# Patient Record
Sex: FEMALE | Race: BLACK OR AFRICAN AMERICAN | NOT HISPANIC OR LATINO
[De-identification: names, ages, dates, MRNs, and addresses within clinical notes are randomized per-mention and may not be internally consistent; named-entity substitution may affect disease eponyms.]

---

## 2022-06-13 ENCOUNTER — APPOINTMENT (OUTPATIENT)
Dept: RHEUMATOLOGY | Facility: CLINIC | Age: 27
End: 2022-06-13

## 2022-06-13 VITALS
SYSTOLIC BLOOD PRESSURE: 119 MMHG | BODY MASS INDEX: 25.05 KG/M2 | RESPIRATION RATE: 16 BRPM | HEART RATE: 93 BPM | WEIGHT: 150.38 LBS | TEMPERATURE: 98 F | HEIGHT: 65 IN | DIASTOLIC BLOOD PRESSURE: 81 MMHG | OXYGEN SATURATION: 100 %

## 2022-06-13 VITALS
RESPIRATION RATE: 16 BRPM | HEIGHT: 65 IN | BODY MASS INDEX: 20.41 KG/M2 | WEIGHT: 122.5 LBS | OXYGEN SATURATION: 100 % | TEMPERATURE: 98 F | DIASTOLIC BLOOD PRESSURE: 67 MMHG | SYSTOLIC BLOOD PRESSURE: 112 MMHG | HEART RATE: 67 BPM

## 2022-06-13 LAB
ALBUMIN SERPL ELPH-MCNC: 5.3 G/DL
ALP BLD-CCNC: 61 U/L
ALT SERPL-CCNC: 12 U/L
ANION GAP SERPL CALC-SCNC: 12 MMOL/L
APPEARANCE: CLEAR
AST SERPL-CCNC: 18 U/L
BACTERIA: NEGATIVE
BASOPHILS # BLD AUTO: 0.04 K/UL
BASOPHILS NFR BLD AUTO: 0.9 %
BILIRUB SERPL-MCNC: 1 MG/DL
BILIRUBIN URINE: NEGATIVE
BLOOD URINE: NEGATIVE
BUN SERPL-MCNC: 8 MG/DL
CALCIUM SERPL-MCNC: 10 MG/DL
CHLORIDE SERPL-SCNC: 101 MMOL/L
CO2 SERPL-SCNC: 27 MMOL/L
COLOR: YELLOW
CREAT SERPL-MCNC: 0.76 MG/DL
CREAT SPEC-SCNC: 315 MG/DL
CREAT/PROT UR: 0.1 RATIO
CRP SERPL-MCNC: <3 MG/L
EGFR: 109 ML/MIN/1.73M2
EOSINOPHIL # BLD AUTO: 0.06 K/UL
EOSINOPHIL NFR BLD AUTO: 1.3 %
ERYTHROCYTE [SEDIMENTATION RATE] IN BLOOD BY WESTERGREN METHOD: 14 MM/HR
GLUCOSE QUALITATIVE U: NEGATIVE
GLUCOSE SERPL-MCNC: 68 MG/DL
HCT VFR BLD CALC: 38.3 %
HGB BLD-MCNC: 12.8 G/DL
HYALINE CASTS: 6 /LPF
IMM GRANULOCYTES NFR BLD AUTO: 0.2 %
KETONES URINE: NEGATIVE
LEUKOCYTE ESTERASE URINE: NEGATIVE
LYMPHOCYTES # BLD AUTO: 1.99 K/UL
LYMPHOCYTES NFR BLD AUTO: 43.5 %
MAN DIFF?: NORMAL
MCHC RBC-ENTMCNC: 30.2 PG
MCHC RBC-ENTMCNC: 33.4 GM/DL
MCV RBC AUTO: 90.3 FL
MICROSCOPIC-UA: NORMAL
MONOCYTES # BLD AUTO: 0.47 K/UL
MONOCYTES NFR BLD AUTO: 10.3 %
NEUTROPHILS # BLD AUTO: 2 K/UL
NEUTROPHILS NFR BLD AUTO: 43.8 %
NITRITE URINE: NEGATIVE
PH URINE: 6.5
PLATELET # BLD AUTO: 280 K/UL
POTASSIUM SERPL-SCNC: 4.6 MMOL/L
PROT SERPL-MCNC: 8 G/DL
PROT UR-MCNC: 15 MG/DL
PROTEIN URINE: NORMAL
RBC # BLD: 4.24 M/UL
RBC # FLD: 13.8 %
RED BLOOD CELLS URINE: 4 /HPF
SODIUM SERPL-SCNC: 141 MMOL/L
SPECIFIC GRAVITY URINE: 1.03
SQUAMOUS EPITHELIAL CELLS: 2 /HPF
TSH SERPL-ACNC: 3.29 UIU/ML
UROBILINOGEN URINE: NORMAL
WBC # FLD AUTO: 4.57 K/UL
WHITE BLOOD CELLS URINE: 1 /HPF

## 2022-06-13 NOTE — HISTORY OF PRESENT ILLNESS
[FreeTextEntry1] : First visit to me. The patient is a 27 yo AA female referred for evaluation of autoimmune disease. History is obtained from the patient; no records are available for review except for a note and lab results from Rheumatology in 2016. \par Current Hx: The patient gives an extensive history of the following complaints and diagnoses over the past 7.5 years:\par •	Episodic back pain and lower body paralysis: these episodes started in 2015 (age 19) and occur randomly. She says that she has been taken to the ER but each time sent home with muscle relaxants that do not work. Also has chronic numbness/tingling of the hands and feet. Seen by a neurologist in 2015 who did MRI’s of the cervical and lumbar spine:\par o	MRI LS spine 5/5/15: facet joint hypertrophy throughout the LS, mild disc bulges at L4-5 and to a lesser degree at L5-S1 that are unchanged compared to prior study 8/1/14.\par o	MRI C-spine 5/5/15: no disc herniation, no canal stenosis, no change in narrowing of the left-sided foraminal narrowing at the C3-4 level compared to MRI 8/1/14.\josefa Continues to have these episodes but thinks they improved when she stopped eating meat. She has seen multiple specialists over the years and been told “pinched nerves”, muscle pain.\par •	Told of CTS in 2016\par •	Episodes of “inflamed, painful” eyes: seen by Ophthalmology and told of a hole in the retina and mild increased ocular pressure\par •	GI: Chronic constipation and nausea with fluctuating weight. Her normal weight is 125-130, it ranges 120-135? Describes episodic difficulty swallowing.\par •	Scalp rash: describes a scaly rash on the scalp that bleeds and scabs for many years that was untreated. Seen by Dermatology in the last several months and told she had “psoriasis of the scalp”. Treated with topical ketoconazole and steroid with good results. Says she has similar scaly rashes on her ears and face that have resolved with the topical treatment.\par •	Nasal sores: c/o sores in the nares since 8/21 that get very inflamed and hurt- these appear to be located along the inside part of the outer nares. No response in 2021 to terbinafine x 30 days.\par •	Chronic Fatigue: describes debilitating fatigue where she cannot function at all and episodes of complete loss of facial muscle tone. Also c/o severe brain fog, memory loss and dizziness.\par •	Chronic pain in the knees, shoulders, hips and wrists, no swelling or warmth.\par •	Fevers: patient says she has fevers but says that T 98 or 99 is a fever for her since her baseline Temp is 96.\par •	Evaluated 2016 by Dr. Sarkar for LBP/autoimmune disease: serology all negative (ALIA, CCP, RF), normal inflammatory markers. Assessment was that the pain was out of proportion to the MRI findings and was c/w myofascial pain. Given Flexeril.\par •	Depression/anxiety: diagnosed 4/15/22: says that she had not been referred to psychiatry before then\par •	Ankylosing Spondylitis: currently seeing a Rheumatologist, Dr. Arcadio Carlisle, who diagnosed her with ankylosing spondylitis based on another MRI that reportedly showed SI joint erosions. Started secukinumab 5/22.\par  \par Medications: meloxicam 15 mg qd prn, secukinumab 300 mg/4 weeks (5/22), Klonazepam 0.5 mg qd, fluoxetine 10 mg qd, fluticasone nasal spray, ketoconazole shampoo, clobetasol solution for scalp, face creams: ketoconazole, triamcinolone 0.1%, CBD medical marijuana\par \par Allergies: she is not sure; had hives after taking some kind of dose-bhavani for bronchitis\par \par Family Hx: 1 sibling with scoliosis and 1 with asthma, otherwise no known autoimmune disease\par \par Social: no tobacco, ETOH or drug use, not working, completed an Associates Degree \par \par OB/GYN: P2, G0, 2 terminations. Has a regular menses but heavy and has been told she has ovarian cysts\par \par No hospitalizations or surgeries\par \par Physical Exam: well developed, well nourished, NAD\par Skin: no rash\par HEENT: no oral/nasal lesions, no alopecia\par No lymphadenopathy\par Lungs: clear BL\par Heart: S1S2, reg rate, no murmers\par Abd: flat, soft, non-tender\par Ext: FROM all joints, + tenderness BL shoulders, elbows, wrists, right MCP 2, 3, 5, PIP 2, 5, left MCP 2, 3, PIP 3, BL knees, no joint swelling, no edema\par Back: diffuse trapezius muscle tenderness, spinal and paraspinal tenderness throughout, including the SI joint area, Murtaza sign negative\par Neuro: no gross abnormalities\par

## 2022-06-13 NOTE — ASSESSMENT
[FreeTextEntry1] : 27 yo woman with extensive list of symptoms for the past 7.5 years (arthralgias, myalgias, chronic fatigue, chronic LBP with episodic associated paralysis, scaly rashes on the scalp and face, nasal sores, chronic constipation and nausea) and has recently been diagnosed with Ankylosing Spondylitis, depression/anxiety and “psoriasis of the scalp”. Of note, the back pain accompanied by paralysis is difficult to explain, there is no residual damage after 7.5 years and the MRI’s done in 2014 and 2015 did not show any significant lesions. The scalp lesions sound like seborrheic dermatitis and have resolved with topical treatment. Had lengthy discussion with the patient. I will need to review records from her MDs in order to generate an opinion. Her symptoms, the lack of residual damage or progression and the normal physical exam do not suggest an autoimmune disease at this time. Plan:\par •	Check labs and serologies today\par •	Patient to send me copies of all her tests; a few things were sent today from the rheumatologist but these consisted only of a negative quantiferon test, negative Hep B, C, Hgb A1C 5.4, normal chemistries (alb 4.4), normal CBC\par •	f/u in 4-6 weeks once I have been able to review the lab tests and prior studies\par

## 2022-06-14 LAB
C3 SERPL-MCNC: 125 MG/DL
C4 SERPL-MCNC: 26 MG/DL
CARDIOLIPIN AB SER IA-ACNC: NEGATIVE
CONFIRM: 28.1 SEC
DRVVT IMM 1:2 NP PPP: NORMAL
DRVVT SCREEN TO CONFIRM RATIO: 0.95 RATIO
DSDNA AB SER-ACNC: 15 IU/ML
ENA RNP AB SER IA-ACNC: 0.2 AL
ENA SM AB SER IA-ACNC: <0.2 AL
ENA SS-A AB SER IA-ACNC: <0.2 AL
ENA SS-B AB SER IA-ACNC: <0.2 AL
SCREEN DRVVT: 31.5 SEC

## 2022-06-15 LAB
ANA SER IF-ACNC: NEGATIVE
GLIADIN IGA SER QL: <5 UNITS
GLIADIN IGG SER QL: <5 UNITS
GLIADIN PEPTIDE IGA SER-ACNC: NEGATIVE
GLIADIN PEPTIDE IGG SER-ACNC: NEGATIVE
TTG IGA SER IA-ACNC: <1.2 U/ML
TTG IGA SER-ACNC: NEGATIVE
TTG IGG SER IA-ACNC: <1.2 U/ML
TTG IGG SER IA-ACNC: NEGATIVE

## 2022-06-16 LAB
CCP AB SER IA-ACNC: <8 UNITS
RF+CCP IGG SER-IMP: NEGATIVE

## 2022-07-12 ENCOUNTER — TRANSCRIPTION ENCOUNTER (OUTPATIENT)
Age: 27
End: 2022-07-12

## 2022-07-13 ENCOUNTER — APPOINTMENT (OUTPATIENT)
Dept: RHEUMATOLOGY | Facility: CLINIC | Age: 27
End: 2022-07-13

## 2022-07-13 VITALS
BODY MASS INDEX: 20.63 KG/M2 | HEART RATE: 60 BPM | RESPIRATION RATE: 16 BRPM | DIASTOLIC BLOOD PRESSURE: 66 MMHG | SYSTOLIC BLOOD PRESSURE: 104 MMHG | TEMPERATURE: 97.7 F | WEIGHT: 124 LBS | OXYGEN SATURATION: 100 %

## 2022-10-20 NOTE — HISTORY OF PRESENT ILLNESS
[FreeTextEntry1] : Follow-up visit. The patient is a 25 yo AA female referred for evaluation of autoimmune disease. History is obtained from the patient; no records are available for review except for a note and lab results from Rheumatology in 2016. She has a history of the following complaints since approximately 2014: \par \par •	Episodic back pain and lower body paralysis: these episodes started in 2015 (age 19) and occur randomly. She says that she has been taken to the ER but each time sent home with muscle relaxants that do not work. Also has chronic numbness/tingling of the hands and feet. Seen by a neurologist in 2015 who did MRI’s of the cervical and lumbar spine:\par o	MRI LS spine 5/5/15: facet joint hypertrophy throughout the LS, mild disc bulges at L4-5 and to a lesser degree at L5-S1 that are unchanged compared to prior study 8/1/14.\par o	MRI C-spine 5/5/15: no disc herniation, no canal stenosis, no change in narrowing of the left-sided foraminal narrowing at the C3-4 level compared to MRI 8/1/14.\josefa Continues to have these episodes but thinks they improved when she stopped eating meat. She has seen multiple specialists over the years and been told “pinched nerves”, muscle pain.\par •	Told of CTS in 2016\par •	Episodes of “inflamed, painful” eyes: seen by Ophthalmology and told of a hole in the retina and mild increased ocular pressure\par •	GI: Chronic constipation and nausea with fluctuating weight. Her normal weight is 125-130, it ranges 120-135? Describes episodic difficulty swallowing.\par •	Scalp rash: describes a scaly rash on the scalp that bleeds and scabs for many years that was untreated. Seen by Dermatology in the last several months and told she had “psoriasis of the scalp”. Treated with topical ketoconazole and steroid with good results. Says she has similar scaly rashes on her ears and face that have resolved with the topical treatment.\par •	Nasal sores: c/o sores in the nares since 8/21 that get very inflamed and hurt- these appear to be located along the inside part of the outer nares. No response in 2021 to terbinafine x 30 days.\par •	Chronic Fatigue: describes debilitating fatigue where she cannot function at all and episodes of complete loss of facial muscle tone. Also c/o severe brain fog, memory loss and dizziness.\par •	Chronic pain in the knees, shoulders, hips and wrists, no swelling or warmth.\par •	Fevers: patient says she has fevers but says that T 98 or 99 is a fever for her since her baseline Temp is 96.\par •	Evaluated 2016 by Dr. Sarkar for LBP/autoimmune disease: serology all negative (ALIA, CCP, RF), normal inflammatory markers. Assessment was that the pain was out of proportion to the MRI findings and was c/w myofascial pain. Given Flexeril.\par •	Depression/anxiety: diagnosed 4/15/22: says that she had not been referred to psychiatry before then\par •	Ankylosing Spondylitis: currently seeing a Rheumatologist, Dr. Arcadio Carlisle, who diagnosed her with ankylosing spondylitis based on another MRI that reportedly showed SI joint erosions. Started secukinumab 5/22.\par \par \par Current Hx: She is here for a follow-up visit. All complaints have not changed. She has continued the secukinumab injections but has not noted any improvement. \par  \par Medications: meloxicam 15 mg qd prn, secukinumab 300 mg/4 weeks (5/22), Klonazepam 0.5 mg qd, fluoxetine 10 mg qd, fluticasone nasal spray, ketoconazole shampoo, clobetasol solution for scalp, face creams: ketoconazole, triamcinolone 0.1%, CBD medical marijuana\par \par Allergies: she is not sure; had hives after taking some kind of dose-bhavani for bronchitis\par \par Family Hx: 1 sibling with scoliosis and 1 with asthma, otherwise no known autoimmune disease\par \par Social: no tobacco, ETOH or drug use, not working, completed an Associates Degree \par \par OB/GYN: P2, G0, 2 terminations. Has a regular menses but heavy and has been told she has ovarian cysts\par \par No hospitalizations or surgeries\par \par Physical Exam: well developed, well nourished, NAD\par no exam done today as she was here for lab results and discussion\par

## 2022-10-20 NOTE — ASSESSMENT
[FreeTextEntry1] : 25 yo woman with extensive list of symptoms since 2014 (arthralgias, myalgias, chronic fatigue, chronic LBP with episodic associated paralysis, scaly rashes on the scalp and face, nasal sores, chronic constipation and nausea) and has recently been diagnosed with Ankylosing Spondylitis, depression/anxiety and “psoriasis of the scalp”. Of note, the back pain accompanied by paralysis is difficult to explain, there is no residual damage after 7.5 years and the MRI’s done in 2014 and 2015 did not show any significant lesions. The scalp lesions sound like seborrheic dermatitis and have resolved with topical treatment. Labs from the last visit are all normal and all serologies, including ALIA, Sm, RNP, Ro, La, CCP, LAC, ACL, complement are all negative. Normal CBC, UA and chemistries. She was going to send me additional medical records (scans in particular) but has not done so. At this time I am unable to make a diagnosis of any autoimmune condition. Had lengthy discussion about this with the patient and suggested that perhaps some of her symptoms may be related to fibromyalgia and/ or depression/anxiety. Plan:\par - release for medical records obtained and will try to get records\par - will call the patient after this \par \par \par Addendum: review of prior imaging studies\par MRI 3/22: chronic BL sacroiliitis with subchondral sclerosis and a few scattered erosions, R>L, no marrow edema\par Xray L-S spine 2/22: mild L5-S1 disc space narrowing\par MRI brain 10/21 (compared to 8/14): normal MRI brain and pituitary without contrast\par abd US 1/4/21: single intrauterine fetus, no ovarian masses or cysts\par MRI c-spine C-: cervical disc herniations: C4-5 impressing on thecal sac with mild central stenosis, C5-C6 central herniation impressing on thecal sac and impinging on the ventral cord surface with mild/mod central stenosis, C6-C7 right paracentral disc herniation impressing on the ventral thecal sac without significant stenosis\par MRI pelvis C-, 2/20: possible mild chronic sacroiliitis, R>L mild gluteus minimus insertional tendinosis/peritendonitis, left ovarian cyst\par MRI LS C-, 8/21: disc bulge at L4-5 annd L5-S1 with no significant central or foraminal stenosis at any lumbar level\par MRI left hip C-, 2/20: mild gluteus minimus tendinosis without tears, small nondisplaced tear involving the anterolateral aspect of the tight acetabular labrum\par MRI LS spine C-, 8/14: annular bulges L4-5, L5-S1 no central canal stenosis or neural foraminal narrowing\par MRI brain c-, 8/14: normal\par MRI C spine C-, 8/14: moderate bony narrowing of the neuroforamen for left C4 nerve root\par MRI C-spine C-, 5/15: no disc herniation or canal stenosis, no change in degree of slight foraminal narrowing at the C3-4 level compared to MRI 8/14\par MRI LS C-, 5/15: facet joint hypertrophy throughout lumbar spine, mild disc bulges at L4-5 and less at L5-S1, unchanged from 8/14, no significant canal or foraminal stenosis\par

## 2024-11-04 ENCOUNTER — EMERGENCY (EMERGENCY)
Facility: HOSPITAL | Age: 29
LOS: 0 days | Discharge: HOME | End: 2024-11-04
Attending: EMERGENCY MEDICINE
Payer: MEDICAID

## 2024-11-04 VITALS
RESPIRATION RATE: 18 BRPM | SYSTOLIC BLOOD PRESSURE: 113 MMHG | TEMPERATURE: 99 F | HEART RATE: 78 BPM | OXYGEN SATURATION: 99 % | DIASTOLIC BLOOD PRESSURE: 78 MMHG

## 2024-11-04 VITALS
WEIGHT: 128.09 LBS | DIASTOLIC BLOOD PRESSURE: 68 MMHG | RESPIRATION RATE: 18 BRPM | OXYGEN SATURATION: 99 % | SYSTOLIC BLOOD PRESSURE: 113 MMHG | HEIGHT: 66 IN | HEART RATE: 70 BPM | TEMPERATURE: 98 F

## 2024-11-04 DIAGNOSIS — R05.9 COUGH, UNSPECIFIED: ICD-10-CM

## 2024-11-04 DIAGNOSIS — R06.00 DYSPNEA, UNSPECIFIED: ICD-10-CM

## 2024-11-04 DIAGNOSIS — M45.9 ANKYLOSING SPONDYLITIS OF UNSPECIFIED SITES IN SPINE: ICD-10-CM

## 2024-11-04 DIAGNOSIS — R07.89 OTHER CHEST PAIN: ICD-10-CM

## 2024-11-04 DIAGNOSIS — Z88.8 ALLERGY STATUS TO OTHER DRUGS, MEDICAMENTS AND BIOLOGICAL SUBSTANCES: ICD-10-CM

## 2024-11-04 LAB
ALBUMIN SERPL ELPH-MCNC: 4 G/DL — SIGNIFICANT CHANGE UP (ref 3.3–5)
ALP SERPL-CCNC: 57 U/L — SIGNIFICANT CHANGE UP (ref 40–120)
ALT FLD-CCNC: 21 U/L — SIGNIFICANT CHANGE UP (ref 12–78)
ANION GAP SERPL CALC-SCNC: 8 MMOL/L — SIGNIFICANT CHANGE UP (ref 5–17)
APTT BLD: 36.6 SEC — HIGH (ref 24.5–35.6)
AST SERPL-CCNC: 36 U/L — SIGNIFICANT CHANGE UP (ref 15–37)
BASOPHILS # BLD AUTO: 0.03 K/UL — SIGNIFICANT CHANGE UP (ref 0–0.2)
BASOPHILS NFR BLD AUTO: 0.4 % — SIGNIFICANT CHANGE UP (ref 0–2)
BILIRUB SERPL-MCNC: 0.5 MG/DL — SIGNIFICANT CHANGE UP (ref 0.2–1.2)
BUN SERPL-MCNC: 12 MG/DL — SIGNIFICANT CHANGE UP (ref 7–23)
CALCIUM SERPL-MCNC: 9 MG/DL — SIGNIFICANT CHANGE UP (ref 8.5–10.1)
CHLORIDE SERPL-SCNC: 105 MMOL/L — SIGNIFICANT CHANGE UP (ref 96–108)
CO2 SERPL-SCNC: 24 MMOL/L — SIGNIFICANT CHANGE UP (ref 22–31)
CREAT SERPL-MCNC: 0.74 MG/DL — SIGNIFICANT CHANGE UP (ref 0.5–1.3)
EGFR: 111 ML/MIN/1.73M2 — SIGNIFICANT CHANGE UP
EOSINOPHIL # BLD AUTO: 0.06 K/UL — SIGNIFICANT CHANGE UP (ref 0–0.5)
EOSINOPHIL NFR BLD AUTO: 0.8 % — SIGNIFICANT CHANGE UP (ref 0–6)
GLUCOSE SERPL-MCNC: 95 MG/DL — SIGNIFICANT CHANGE UP (ref 70–99)
HCG SERPL-ACNC: <1 MIU/ML — SIGNIFICANT CHANGE UP
HCT VFR BLD CALC: 39.1 % — SIGNIFICANT CHANGE UP (ref 34.5–45)
HGB BLD-MCNC: 12.7 G/DL — SIGNIFICANT CHANGE UP (ref 11.5–15.5)
IMM GRANULOCYTES NFR BLD AUTO: 0.3 % — SIGNIFICANT CHANGE UP (ref 0–0.9)
INR BLD: 1.04 RATIO — SIGNIFICANT CHANGE UP (ref 0.85–1.16)
LIDOCAIN IGE QN: 55 U/L — SIGNIFICANT CHANGE UP (ref 13–75)
LYMPHOCYTES # BLD AUTO: 2.76 K/UL — SIGNIFICANT CHANGE UP (ref 1–3.3)
LYMPHOCYTES # BLD AUTO: 34.9 % — SIGNIFICANT CHANGE UP (ref 13–44)
MAGNESIUM SERPL-MCNC: 2 MG/DL — SIGNIFICANT CHANGE UP (ref 1.6–2.6)
MCHC RBC-ENTMCNC: 28.8 PG — SIGNIFICANT CHANGE UP (ref 27–34)
MCHC RBC-ENTMCNC: 32.5 G/DL — SIGNIFICANT CHANGE UP (ref 32–36)
MCV RBC AUTO: 88.7 FL — SIGNIFICANT CHANGE UP (ref 80–100)
MONOCYTES # BLD AUTO: 0.71 K/UL — SIGNIFICANT CHANGE UP (ref 0–0.9)
MONOCYTES NFR BLD AUTO: 9 % — SIGNIFICANT CHANGE UP (ref 2–14)
NEUTROPHILS # BLD AUTO: 4.33 K/UL — SIGNIFICANT CHANGE UP (ref 1.8–7.4)
NEUTROPHILS NFR BLD AUTO: 54.6 % — SIGNIFICANT CHANGE UP (ref 43–77)
NRBC # BLD: 0 /100 WBCS — SIGNIFICANT CHANGE UP (ref 0–0)
NT-PROBNP SERPL-SCNC: 13 PG/ML — SIGNIFICANT CHANGE UP (ref 0–125)
PLATELET # BLD AUTO: 287 K/UL — SIGNIFICANT CHANGE UP (ref 150–400)
POTASSIUM SERPL-MCNC: 4.9 MMOL/L — SIGNIFICANT CHANGE UP (ref 3.5–5.3)
POTASSIUM SERPL-SCNC: 4.9 MMOL/L — SIGNIFICANT CHANGE UP (ref 3.5–5.3)
PROT SERPL-MCNC: 8.4 GM/DL — HIGH (ref 6–8.3)
PROTHROM AB SERPL-ACNC: 11.8 SEC — SIGNIFICANT CHANGE UP (ref 9.9–13.4)
RBC # BLD: 4.41 M/UL — SIGNIFICANT CHANGE UP (ref 3.8–5.2)
RBC # FLD: 14.1 % — SIGNIFICANT CHANGE UP (ref 10.3–14.5)
SODIUM SERPL-SCNC: 137 MMOL/L — SIGNIFICANT CHANGE UP (ref 135–145)
TROPONIN I, HIGH SENSITIVITY RESULT: 10.8 NG/L — SIGNIFICANT CHANGE UP
WBC # BLD: 7.91 K/UL — SIGNIFICANT CHANGE UP (ref 3.8–10.5)
WBC # FLD AUTO: 7.91 K/UL — SIGNIFICANT CHANGE UP (ref 3.8–10.5)

## 2024-11-04 PROCEDURE — 99285 EMERGENCY DEPT VISIT HI MDM: CPT

## 2024-11-04 PROCEDURE — 93010 ELECTROCARDIOGRAM REPORT: CPT

## 2024-11-04 PROCEDURE — 71046 X-RAY EXAM CHEST 2 VIEWS: CPT | Mod: 26

## 2024-11-04 RX ORDER — ACETAMINOPHEN 500 MG
1000 TABLET ORAL ONCE
Refills: 0 | Status: COMPLETED | OUTPATIENT
Start: 2024-11-04 | End: 2024-11-04

## 2024-11-04 RX ORDER — KETOROLAC TROMETHAMINE 30 MG/ML
30 INJECTION INTRAMUSCULAR; INTRAVENOUS ONCE
Refills: 0 | Status: DISCONTINUED | OUTPATIENT
Start: 2024-11-04 | End: 2024-11-04

## 2024-11-04 RX ORDER — NAPROXEN 250 MG/1
1 TABLET ORAL
Qty: 42 | Refills: 0
Start: 2024-11-04 | End: 2024-11-24

## 2024-11-04 RX ADMIN — Medication 400 MILLIGRAM(S): at 19:36

## 2024-11-04 RX ADMIN — KETOROLAC TROMETHAMINE 30 MILLIGRAM(S): 30 INJECTION INTRAMUSCULAR; INTRAVENOUS at 21:53

## 2024-11-04 NOTE — ED PROVIDER NOTE - NS ED ROS FT
CONSTITUTIONAL: No fever, no chills, no fatigue  EYES: No visual changes  ENT: No ear pain, no sore throat  CARDIOVASCULAR: no palpitations  GI: No abdominal pain, no nausea, no vomiting, no constipation, no diarrhea  GENITOURINARY: No dysuria, no frequency, no hematuria  MUSKULOSKELETAL:  no joint pain, no myalgias  SKIN: No rash  NEURO: No headache    ALL OTHER SYSTEMS NEGATIVE.

## 2024-11-04 NOTE — ED PROVIDER NOTE - PHYSICAL EXAMINATION
GEN: Awake, alert, interactive, NAD.  HEAD AND NECK: NC/AT. Airway patent. Neck supple.   EYES:  Clear b/l. EOMI. PERRL.   ENT: Moist mucus membranes.   CARDIAC: Regular rate, regular rhythm. No evident pedal edema.    RESP/CHEST: Normal respiratory effort with no use of accessory muscles or retractions. Clear throughout on auscultation.  ABD: soft, non-distended, non-tender. No rebound, no guarding.   BACK: No midline spinal TTP. No CVAT.   EXTREMITIES: Moving all extremities with no apparent deformities. (+) equal pulses in upper and lower extremities.   SKIN: Warm, dry, intact normal color. No rash.   NEURO: AOx3, CN II-XII grossly intact, no focal deficits.   PSYCH: Appropriate mood and affect.

## 2024-11-04 NOTE — ED PROVIDER NOTE - PATIENT PORTAL LINK FT
You can access the FollowMyHealth Patient Portal offered by [facility] by registering at the following website: [email or web address]. By joining [employer]’s FollowMyHealth portal, you will also be able to view your health information using other applications (apps) compatible with our system.

## 2024-11-04 NOTE — ED PROVIDER NOTE - OBJECTIVE STATEMENT
28 y/o female with ankylosing spondylitis," fluid in the lungs" here with left sided chest pain x 1 week. Pt states pain radiated to her back yesterday. Pt states pain is worse with breathing and movement. Mild dyspnea. Denies fever, chills. Pt states she have mild cough with white phlegm and blood streaks. Denies recent travel, sick contact, nausea, vomiting, abdominal pain. Denies smoking, drinking, drugs. Pt denies heavy lifting.

## 2024-11-04 NOTE — ED PROVIDER NOTE - CLINICAL SUMMARY MEDICAL DECISION MAKING FREE TEXT BOX
28 y/o female ankylosing spondylitis here with left chest pain x 1 week radiating to the back , worse with deep breathing. Vs stable.   Ddx include but not limited to msk pain, pe, acs, pna.   Will obtain cardiac labs, ekg, cxr and pain meds and reassess.

## 2024-11-04 NOTE — ED PROVIDER NOTE - ATTENDING APP SHARED VISIT CONTRIBUTION OF CARE
I independently evaluated the patient and I concur with RICKEY BUTCHER&BRIDGETTE as above. I suspect pain is most likely rheumatologic in origin but agree with workup.

## 2024-11-04 NOTE — ED ADULT TRIAGE NOTE - CHIEF COMPLAINT QUOTE
c/o L chest pain x1 week radiating to back since yesterday. Reports difficulty breathing with deep breaths. Hx of "fluid around lungs."